# Patient Record
Sex: FEMALE | Race: WHITE | NOT HISPANIC OR LATINO | Employment: OTHER | URBAN - METROPOLITAN AREA
[De-identification: names, ages, dates, MRNs, and addresses within clinical notes are randomized per-mention and may not be internally consistent; named-entity substitution may affect disease eponyms.]

---

## 2017-01-04 RX ORDER — DICLOFENAC SODIUM 50 MG/1
TABLET, DELAYED RELEASE ORAL
Qty: 180 TABLET | Refills: 0 | Status: SHIPPED | OUTPATIENT
Start: 2017-01-04 | End: 2017-06-01 | Stop reason: SDUPTHER

## 2017-01-18 RX ORDER — TRAMADOL HYDROCHLORIDE 50 MG/1
TABLET ORAL
Qty: 30 TABLET | Refills: 1 | Status: SHIPPED | OUTPATIENT
Start: 2017-01-18 | End: 2017-09-13 | Stop reason: SDUPTHER

## 2017-05-05 RX ORDER — METOPROLOL TARTRATE 25 MG/1
TABLET, FILM COATED ORAL
Qty: 180 TABLET | Refills: 0 | Status: SHIPPED | OUTPATIENT
Start: 2017-05-05 | End: 2017-05-25

## 2017-05-05 RX ORDER — LOSARTAN POTASSIUM 100 MG/1
TABLET ORAL
Qty: 90 TABLET | Refills: 0 | Status: SHIPPED | OUTPATIENT
Start: 2017-05-05 | End: 2017-09-10 | Stop reason: SDUPTHER

## 2017-05-25 RX ORDER — METOPROLOL TARTRATE 25 MG/1
TABLET, FILM COATED ORAL
Qty: 180 TABLET | Refills: 0 | Status: ON HOLD | OUTPATIENT
Start: 2017-05-25 | End: 2018-03-22 | Stop reason: HOSPADM

## 2017-06-01 RX ORDER — DICLOFENAC SODIUM 50 MG/1
TABLET, DELAYED RELEASE ORAL
Qty: 180 TABLET | Refills: 0 | Status: SHIPPED | OUTPATIENT
Start: 2017-06-01 | End: 2017-09-03 | Stop reason: SDUPTHER

## 2017-07-25 RX ORDER — ALLOPURINOL 100 MG/1
TABLET ORAL
Qty: 90 TABLET | Refills: 0 | Status: SHIPPED | OUTPATIENT
Start: 2017-07-25 | End: 2017-10-19 | Stop reason: SDUPTHER

## 2017-09-03 DIAGNOSIS — I10 HTN (HYPERTENSION), BENIGN: Primary | ICD-10-CM

## 2017-09-03 DIAGNOSIS — E78.5 DYSLIPIDEMIA: ICD-10-CM

## 2017-09-05 RX ORDER — METOPROLOL TARTRATE 25 MG/1
TABLET, FILM COATED ORAL
Qty: 180 TABLET | Refills: 0 | Status: SHIPPED | OUTPATIENT
Start: 2017-09-05 | End: 2017-09-13 | Stop reason: SDUPTHER

## 2017-09-05 RX ORDER — DICLOFENAC SODIUM 50 MG/1
TABLET, DELAYED RELEASE ORAL
Qty: 60 TABLET | Refills: 0 | Status: SHIPPED | OUTPATIENT
Start: 2017-09-05 | End: 2017-09-05 | Stop reason: SDUPTHER

## 2017-09-05 RX ORDER — DICLOFENAC SODIUM 50 MG/1
TABLET, DELAYED RELEASE ORAL
Qty: 180 TABLET | Refills: 0 | Status: SHIPPED | OUTPATIENT
Start: 2017-09-05 | End: 2017-12-22 | Stop reason: SDUPTHER

## 2017-09-05 NOTE — TELEPHONE ENCOUNTER
Call pt and advise due for OV, lab.  Let us know of plans , need before can refill meds--particularly need lab in view of chronic use of diclofenac

## 2017-09-05 NOTE — TELEPHONE ENCOUNTER
Spoke with pt and was able to schedule an appt for preop and labs on 9/13/17.    Please sign attached lab orders.    Thanks.

## 2017-09-11 RX ORDER — LOSARTAN POTASSIUM 100 MG/1
TABLET ORAL
Qty: 90 TABLET | Refills: 1 | Status: ON HOLD | OUTPATIENT
Start: 2017-09-11 | End: 2018-03-22 | Stop reason: HOSPADM

## 2017-09-13 ENCOUNTER — LAB VISIT (OUTPATIENT)
Dept: LAB | Facility: HOSPITAL | Age: 67
End: 2017-09-13
Attending: INTERNAL MEDICINE
Payer: MEDICARE

## 2017-09-13 ENCOUNTER — OFFICE VISIT (OUTPATIENT)
Dept: INTERNAL MEDICINE | Facility: CLINIC | Age: 67
End: 2017-09-13
Payer: MEDICARE

## 2017-09-13 VITALS
HEART RATE: 54 BPM | BODY MASS INDEX: 41.54 KG/M2 | DIASTOLIC BLOOD PRESSURE: 67 MMHG | WEIGHT: 280.44 LBS | HEIGHT: 69 IN | SYSTOLIC BLOOD PRESSURE: 162 MMHG | TEMPERATURE: 99 F

## 2017-09-13 DIAGNOSIS — E11.9 DIABETES MELLITUS, TYPE II: ICD-10-CM

## 2017-09-13 DIAGNOSIS — Z01.818 PREOP EXAM FOR INTERNAL MEDICINE: Primary | ICD-10-CM

## 2017-09-13 DIAGNOSIS — I10 HTN (HYPERTENSION), BENIGN: ICD-10-CM

## 2017-09-13 DIAGNOSIS — M17.12 OSTEOARTHRITIS OF LEFT KNEE, UNSPECIFIED OSTEOARTHRITIS TYPE: ICD-10-CM

## 2017-09-13 DIAGNOSIS — E78.5 DYSLIPIDEMIA: ICD-10-CM

## 2017-09-13 DIAGNOSIS — G47.33 OBSTRUCTIVE SLEEP APNEA: ICD-10-CM

## 2017-09-13 LAB
ALBUMIN SERPL BCP-MCNC: 3.1 G/DL
ALP SERPL-CCNC: 92 U/L
ALT SERPL W/O P-5'-P-CCNC: 12 U/L
ANION GAP SERPL CALC-SCNC: 7 MMOL/L
AST SERPL-CCNC: 23 U/L
BASOPHILS # BLD AUTO: 0.03 K/UL
BASOPHILS NFR BLD: 0.7 %
BILIRUB SERPL-MCNC: 0.7 MG/DL
BUN SERPL-MCNC: 25 MG/DL
CALCIUM SERPL-MCNC: 9.1 MG/DL
CHLORIDE SERPL-SCNC: 108 MMOL/L
CHOLEST SERPL-MCNC: 218 MG/DL
CHOLEST/HDLC SERPL: 5.2 {RATIO}
CO2 SERPL-SCNC: 28 MMOL/L
CREAT SERPL-MCNC: 1.3 MG/DL
DIFFERENTIAL METHOD: ABNORMAL
EOSINOPHIL # BLD AUTO: 0.4 K/UL
EOSINOPHIL NFR BLD: 8 %
ERYTHROCYTE [DISTWIDTH] IN BLOOD BY AUTOMATED COUNT: 14.1 %
EST. GFR  (AFRICAN AMERICAN): 48.4 ML/MIN/1.73 M^2
EST. GFR  (NON AFRICAN AMERICAN): 42 ML/MIN/1.73 M^2
ESTIMATED AVG GLUCOSE: 105 MG/DL
GLUCOSE SERPL-MCNC: 88 MG/DL
HBA1C MFR BLD HPLC: 5.3 %
HCT VFR BLD AUTO: 33.1 %
HDLC SERPL-MCNC: 42 MG/DL
HDLC SERPL: 19.3 %
HGB BLD-MCNC: 10.8 G/DL
LDLC SERPL CALC-MCNC: 153.2 MG/DL
LYMPHOCYTES # BLD AUTO: 1.7 K/UL
LYMPHOCYTES NFR BLD: 39.3 %
MCH RBC QN AUTO: 30.9 PG
MCHC RBC AUTO-ENTMCNC: 32.6 G/DL
MCV RBC AUTO: 95 FL
MONOCYTES # BLD AUTO: 0.5 K/UL
MONOCYTES NFR BLD: 11.1 %
NEUTROPHILS # BLD AUTO: 1.8 K/UL
NEUTROPHILS NFR BLD: 40.7 %
NONHDLC SERPL-MCNC: 176 MG/DL
PLATELET # BLD AUTO: 144 K/UL
PMV BLD AUTO: 11.4 FL
POTASSIUM SERPL-SCNC: 5.3 MMOL/L
PROT SERPL-MCNC: 6.8 G/DL
RBC # BLD AUTO: 3.49 M/UL
SODIUM SERPL-SCNC: 143 MMOL/L
TRIGL SERPL-MCNC: 114 MG/DL
TSH SERPL DL<=0.005 MIU/L-ACNC: 3.27 UIU/ML
WBC # BLD AUTO: 4.4 K/UL

## 2017-09-13 PROCEDURE — 80061 LIPID PANEL: CPT

## 2017-09-13 PROCEDURE — 99999 PR PBB SHADOW E&M-EST. PATIENT-LVL III: CPT | Mod: PBBFAC,,, | Performed by: INTERNAL MEDICINE

## 2017-09-13 PROCEDURE — 3078F DIAST BP <80 MM HG: CPT | Mod: ,,, | Performed by: INTERNAL MEDICINE

## 2017-09-13 PROCEDURE — 3077F SYST BP >= 140 MM HG: CPT | Mod: ,,, | Performed by: INTERNAL MEDICINE

## 2017-09-13 PROCEDURE — 83036 HEMOGLOBIN GLYCOSYLATED A1C: CPT

## 2017-09-13 PROCEDURE — 93005 ELECTROCARDIOGRAM TRACING: CPT | Mod: PBBFAC,PO | Performed by: INTERNAL MEDICINE

## 2017-09-13 PROCEDURE — 1125F AMNT PAIN NOTED PAIN PRSNT: CPT | Mod: ,,, | Performed by: INTERNAL MEDICINE

## 2017-09-13 PROCEDURE — 1159F MED LIST DOCD IN RCRD: CPT | Mod: ,,, | Performed by: INTERNAL MEDICINE

## 2017-09-13 PROCEDURE — 99214 OFFICE O/P EST MOD 30 MIN: CPT | Mod: S$PBB,,, | Performed by: INTERNAL MEDICINE

## 2017-09-13 PROCEDURE — 36415 COLL VENOUS BLD VENIPUNCTURE: CPT | Mod: PO

## 2017-09-13 PROCEDURE — 93010 ELECTROCARDIOGRAM REPORT: CPT | Mod: ,,, | Performed by: INTERNAL MEDICINE

## 2017-09-13 PROCEDURE — 99213 OFFICE O/P EST LOW 20 MIN: CPT | Mod: PBBFAC,PO | Performed by: INTERNAL MEDICINE

## 2017-09-13 PROCEDURE — 85025 COMPLETE CBC W/AUTO DIFF WBC: CPT

## 2017-09-13 PROCEDURE — 80053 COMPREHEN METABOLIC PANEL: CPT

## 2017-09-13 PROCEDURE — 84443 ASSAY THYROID STIM HORMONE: CPT

## 2017-09-13 RX ORDER — BUDESONIDE AND FORMOTEROL FUMARATE DIHYDRATE 160; 4.5 UG/1; UG/1
2 AEROSOL RESPIRATORY (INHALATION) EVERY 12 HOURS
Qty: 10.2 G | Refills: 6 | Status: SHIPPED | OUTPATIENT
Start: 2017-09-13 | End: 2018-10-17

## 2017-09-13 RX ORDER — TRAMADOL HYDROCHLORIDE 50 MG/1
50 TABLET ORAL EVERY 6 HOURS PRN
Qty: 60 TABLET | Refills: 0 | Status: ON HOLD | OUTPATIENT
Start: 2017-09-13 | End: 2018-03-22 | Stop reason: HOSPADM

## 2017-09-13 NOTE — PROGRESS NOTES
"History of present illness:  67-year-old lady who is in today for a preoperative medical evaluation for planned left total knee arthroplasty scheduled for 2017 by Dr. Luis.  The patient has morbid obesity, hypertension, dyslipidemia and reactive airway disease.  Previously was treated for diabetes mellitus but has not required pharmacologic therapy since significant weight loss after bariatric surgery.  Currently she is without chest pain, palpitations, syncope, shortness of breath, cough, claudication.  She takes medications as directed.  She has tolerated previous surgeries without issue.    Current medications:  Losartan 100 mg daily.  Metoprolol 25 mg twice a day.  Amlodipine 5 mg daily.  Tramadol 50 mg when necessary  When necessary Symbicort inhaler.  Allopurinol 100 mg daily.  When necessary diclofenac.  Aspirin 81 mg daily.    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  Intolerant to adhesive tape.    Past medical history:  Hypertension.  Dyslipidemia.  Morbid obesity.  Reactive airway disease.  Status post bariatric surgery.  Previously treated for diabetes, pharmacologic therapy not record post weight loss.    Past surgical history:    Tonsillectomy.  Gastric sleeve bariatric surgery.  Belt abdominoplasty.  Rotator cuff repair.  Ankle arthroscopic surgery.    Social history:  Nonsmoker.  No alcohol excess.    Review of systems:  General: no fever, chills, generalized body aches. No unexpected weight loss.  Eyes:  No visual disturbances.  HEENT:  No hoarseness, dysphagia, ear pain.  Respiratory:  No cough, no shortness of breath.  Cardiovascular: no chest pain, palpitations, cough, exertional limb pain. No edema.  GI: no nausea, vomiting.  No abdominal pain. No change in bowel habits.  No melena, no hematochezia.  : no dysuria. No change in the color or character of the urine. No urinary frequency.  Skin:  No rashes or other concerns.  Psych:  No emotional " issues    Physical examination:  Vital signs: All pressure 142/70 taken by this examiner.  HEENT: Mouth appears normal no thrush.  Neck supple no masses.  Lungs: Clear to auscultation.  Cardiovascular: Normal heart sounds with no significant murmur.  Carotids full bilaterally without bruits.  No ischemic changes lower extremities.  GI: The abdomen is soft and benign.  Neurologic: No gross motor deficits.  Gait normal.  Mental status: Alert oriented affect and mood all appropriate.    Impression:  67-year-old lady with morbid obesity BMI 41.41, hypertension, gout, dyslipidemia and reactive airway disease all currently controlled and stable pending review of lab data.    Plan:  Review pending laboratory data to include CBC chemistry profile and others and if all reasonable she will be considered a reasonable candidate for the planned procedure.    Addendum:  Follow-up CBC, metabolic profile and ECGs are noted and reviewed.  She has a mild anemia which is normocytic normochromic.  Ferritin is normal.  She is a reasonable candidate for the planned orthopedic surgery.  Our evaluation will will be forwarded to her orthopedic physician Dr. Luis.  Follow-up after surgery

## 2017-09-28 ENCOUNTER — TELEPHONE (OUTPATIENT)
Dept: INTERNAL MEDICINE | Facility: CLINIC | Age: 67
End: 2017-09-28

## 2017-09-28 DIAGNOSIS — I10 HTN (HYPERTENSION), BENIGN: ICD-10-CM

## 2017-09-28 DIAGNOSIS — D64.9 ANEMIA, UNSPECIFIED TYPE: Primary | ICD-10-CM

## 2017-09-28 NOTE — TELEPHONE ENCOUNTER
I spoke with the patient this evening by phone and discussed her recent lab data.  Very mild normocytic normochromic anemia slightly more so than when she has demonstrated in the last year.  Potassium is upper normal range more than likely due to her ARB therapy.  We'll have her come in for repeat CBC, iron studies and recheck basic metabolic profile.    Jose E-  Please call the patient and set update for her to come in for a nonfasting blood work-CBC, BMP, iron studies.

## 2017-09-29 ENCOUNTER — LAB VISIT (OUTPATIENT)
Dept: LAB | Facility: HOSPITAL | Age: 67
End: 2017-09-29
Attending: INTERNAL MEDICINE
Payer: MEDICARE

## 2017-09-29 DIAGNOSIS — I10 HTN (HYPERTENSION), BENIGN: ICD-10-CM

## 2017-09-29 DIAGNOSIS — D64.9 ANEMIA, UNSPECIFIED TYPE: ICD-10-CM

## 2017-09-29 LAB
ANION GAP SERPL CALC-SCNC: 5 MMOL/L
BASOPHILS # BLD AUTO: 0.03 K/UL
BASOPHILS NFR BLD: 0.6 %
BUN SERPL-MCNC: 20 MG/DL
CALCIUM SERPL-MCNC: 8.9 MG/DL
CHLORIDE SERPL-SCNC: 108 MMOL/L
CO2 SERPL-SCNC: 29 MMOL/L
CREAT SERPL-MCNC: 1 MG/DL
DIFFERENTIAL METHOD: ABNORMAL
EOSINOPHIL # BLD AUTO: 0.4 K/UL
EOSINOPHIL NFR BLD: 6.6 %
ERYTHROCYTE [DISTWIDTH] IN BLOOD BY AUTOMATED COUNT: 14.4 %
EST. GFR  (AFRICAN AMERICAN): >60 ML/MIN/1.73 M^2
EST. GFR  (NON AFRICAN AMERICAN): 57.6 ML/MIN/1.73 M^2
FERRITIN SERPL-MCNC: 41 NG/ML
GLUCOSE SERPL-MCNC: 83 MG/DL
HCT VFR BLD AUTO: 33.8 %
HGB BLD-MCNC: 10.7 G/DL
IRON SERPL-MCNC: 77 UG/DL
LYMPHOCYTES # BLD AUTO: 2 K/UL
LYMPHOCYTES NFR BLD: 36.6 %
MCH RBC QN AUTO: 30.7 PG
MCHC RBC AUTO-ENTMCNC: 31.7 G/DL
MCV RBC AUTO: 97 FL
MONOCYTES # BLD AUTO: 0.5 K/UL
MONOCYTES NFR BLD: 8.8 %
NEUTROPHILS # BLD AUTO: 2.6 K/UL
NEUTROPHILS NFR BLD: 47.2 %
PLATELET # BLD AUTO: 159 K/UL
PMV BLD AUTO: 11 FL
POTASSIUM SERPL-SCNC: 4.5 MMOL/L
RBC # BLD AUTO: 3.48 M/UL
SATURATED IRON: 22 %
SODIUM SERPL-SCNC: 142 MMOL/L
TOTAL IRON BINDING CAPACITY: 348 UG/DL
TRANSFERRIN SERPL-MCNC: 235 MG/DL
WBC # BLD AUTO: 5.44 K/UL

## 2017-09-29 PROCEDURE — 36415 COLL VENOUS BLD VENIPUNCTURE: CPT | Mod: PO

## 2017-09-29 PROCEDURE — 80048 BASIC METABOLIC PNL TOTAL CA: CPT

## 2017-09-29 PROCEDURE — 85025 COMPLETE CBC W/AUTO DIFF WBC: CPT

## 2017-09-29 PROCEDURE — 83540 ASSAY OF IRON: CPT

## 2017-09-29 PROCEDURE — 82728 ASSAY OF FERRITIN: CPT

## 2017-09-29 NOTE — TELEPHONE ENCOUNTER
Spoke with pt who requested to have lab appt for today (9/29/17) at 1 pm at the [facility] as she will be in [place] today for another appt.    Scheduled.

## 2017-10-18 ENCOUNTER — TELEPHONE (OUTPATIENT)
Dept: INTERNAL MEDICINE | Facility: CLINIC | Age: 67
End: 2017-10-18

## 2017-10-18 NOTE — TELEPHONE ENCOUNTER
----- Message from Clarita Cynthia sent at 10/18/2017 11:52 AM CDT -----  Contact: pt [phone]  Pt would a copy of her EKG results,fax to [facility] [phone] pt is having surgery on 10- ,please advise

## 2017-10-19 ENCOUNTER — TELEPHONE (OUTPATIENT)
Dept: INTERNAL MEDICINE | Facility: CLINIC | Age: 67
End: 2017-10-19

## 2017-10-19 RX ORDER — ALLOPURINOL 100 MG/1
TABLET ORAL
Qty: 90 TABLET | Refills: 0 | Status: SHIPPED | OUTPATIENT
Start: 2017-10-19 | End: 2018-01-18 | Stop reason: SDUPTHER

## 2017-10-19 NOTE — TELEPHONE ENCOUNTER
----- Message from Day Vasquez sent at 10/19/2017  2:38 PM CDT -----  Contact: Cassidy Cruz office [phone]  Requesting confirmation of paperwork faxed 10/18        Fax [phone]

## 2017-10-19 NOTE — TELEPHONE ENCOUNTER
Spoke with Cassidy at Dr. Luis's office who advised that the clearance paperwork that was sent earlier today went directly to the hospital at [place] but that Dr. Luis needs the same clearance paperwork for his files in the office.    Faxed again to Dr. Luis's office.

## 2017-11-13 ENCOUNTER — TELEPHONE (OUTPATIENT)
Dept: INTERNAL MEDICINE | Facility: CLINIC | Age: 67
End: 2017-11-13

## 2017-11-13 RX ORDER — METHYLPREDNISOLONE 4 MG/1
TABLET ORAL
Qty: 1 PACKAGE | Refills: 0 | Status: SHIPPED | OUTPATIENT
Start: 2017-11-13 | End: 2017-12-04

## 2017-11-13 NOTE — TELEPHONE ENCOUNTER
----- Message from James Arndt sent at 11/13/2017 10:31 AM CST -----  Contact: Pt at [phone]  Pt said script allopurinol (ZYLOPRIM) 100 MG tablet is not working because the gout has flared up again. Pt requesting a call back to discuss.

## 2017-11-13 NOTE — TELEPHONE ENCOUNTER
Is she taking anything for the gout flare?  The dose of allopurinol will need to be increased but not until the current flare has subsided.

## 2017-11-15 ENCOUNTER — TELEPHONE (OUTPATIENT)
Dept: INTERNAL MEDICINE | Facility: CLINIC | Age: 67
End: 2017-11-15

## 2017-11-15 NOTE — TELEPHONE ENCOUNTER
Yes, the first couple days are higher dose and can cause those sx's in some people. Should subside with continuing the lower doses

## 2017-11-15 NOTE — TELEPHONE ENCOUNTER
----- Message from Bernie Bass sent at 11/15/2017  7:41 AM CST -----  Contact: self [phone] (M)  Patient state she had side effect from medication methylPREDNISolone (MEDROL DOSEPACK) 4 mg tablet, woke up with headache,nausea with dizziness, when pt call in asks if she were dizzy stated not at this moment.    Please advise

## 2017-11-20 ENCOUNTER — TELEPHONE (OUTPATIENT)
Dept: INTERNAL MEDICINE | Facility: CLINIC | Age: 67
End: 2017-11-20

## 2017-11-20 NOTE — TELEPHONE ENCOUNTER
Informed the patient that Dr. Kat is out of the office. Patient stated that the prednisone didn't help concerning the gout. Patient wanted to know any recommendations?

## 2017-11-20 NOTE — TELEPHONE ENCOUNTER
----- Message from Susan Mendoza sent at 11/20/2017  3:44 PM CST -----  Contact: patient- [phone]  Finished medication and it didn't help. Prednisone ( steroid)

## 2017-11-22 ENCOUNTER — PATIENT MESSAGE (OUTPATIENT)
Dept: INTERNAL MEDICINE | Facility: CLINIC | Age: 67
End: 2017-11-22

## 2017-11-22 ENCOUNTER — TELEPHONE (OUTPATIENT)
Dept: INTERNAL MEDICINE | Facility: CLINIC | Age: 67
End: 2017-11-22

## 2017-11-22 RX ORDER — COLCHICINE 0.6 MG/1
TABLET ORAL
Qty: 30 TABLET | Refills: 1 | Status: SHIPPED | OUTPATIENT
Start: 2017-11-22 | End: 2017-11-24 | Stop reason: SDUPTHER

## 2017-11-22 NOTE — TELEPHONE ENCOUNTER
Spoke with pt who advises that she is also taking Diclofenac (2 daily).    Please advise if pt is to take Colchicine as well.    Thanks.

## 2017-11-22 NOTE — TELEPHONE ENCOUNTER
"Clarify what joint is having "joint flare"  Has she taken anything other than the steroid?  ?colchicine?  Her diclofenac?  "

## 2017-11-22 NOTE — TELEPHONE ENCOUNTER
I had also wanted clarification if she had taken anything else for attack such as her diclofenac or colchicine.  I will assume she has not tried colchicine.  please call in rx as entered.  Let us know

## 2017-11-22 NOTE — TELEPHONE ENCOUNTER
Per phone note from 11/13/17, pt has been taking pain meds (from her knee surgery) and allopurinol.    Spoke with pt who advises that her great toe and 2nd digit of her L foot. And her foot feels like it's being squeezed and feels like it's on fire.      At night she cannot put heavy covers on as it hurts, and her knee replacement is the L knee as well.    She is still taking the allopurinol and pain meds.    She has finished the steroids.    Please advise.    Thanks.

## 2017-11-22 NOTE — TELEPHONE ENCOUNTER
----- Message from Latesha Flor sent at 11/22/2017  9:51 AM CST -----  Contact: Pt [phone]  Patient would like to get medical advice.  Symptoms (please be specific):  gout  How long has patient had these symptoms:  2 weeks   Pharmacy name and phone #:  [facility] [id number] [place] [address] [id number] AT [facility] [address] [phone] (Phone)  [phone] (Fax)  Any drug allergies:    Comments: pt states she finished medication and is not feeling better

## 2017-11-24 ENCOUNTER — TELEPHONE (OUTPATIENT)
Dept: INTERNAL MEDICINE | Facility: CLINIC | Age: 67
End: 2017-11-24

## 2017-11-24 RX ORDER — COLCHICINE 0.6 MG/1
TABLET ORAL
Qty: 30 TABLET | Refills: 1 | Status: SHIPPED | OUTPATIENT
Start: 2017-11-24 | End: 2018-01-19

## 2017-11-24 NOTE — TELEPHONE ENCOUNTER
I sent as call in because of sig, sometimes will not go through e-sig if the Sig is too lengthy or other.  I will try to send in through E-sig and see what happens--will print automatically if does not go through.

## 2017-11-24 NOTE — TELEPHONE ENCOUNTER
Dr. Kat we are unable to get through to the pharmacy through it's automated system.    The Colchicine was set to be phoned in.    Please resend as loaded for normal delivery.    Thanks.

## 2017-11-24 NOTE — TELEPHONE ENCOUNTER
----- Message from Latesha Flor sent at 11/24/2017  8:55 AM CST -----  Contact: Pt   Pt would like a call back from the nurse. She states that she was told that a prescription was called in on Wednesday 11/22 but the pharmacy has no record of anything being called in. Please advise pt.        [facility] [employer] [address] [place] [address] [address] AT [facility] [place] [facility] [address] [phone] (Phone)  [phone] (Fax)

## 2017-11-27 RX ORDER — AMLODIPINE BESYLATE 5 MG/1
TABLET ORAL
Qty: 90 TABLET | Refills: 3 | Status: ON HOLD | OUTPATIENT
Start: 2017-11-27 | End: 2018-03-22 | Stop reason: HOSPADM

## 2017-12-12 ENCOUNTER — OFFICE VISIT (OUTPATIENT)
Dept: INTERNAL MEDICINE | Facility: CLINIC | Age: 67
End: 2017-12-12
Payer: MEDICARE

## 2017-12-12 ENCOUNTER — HOSPITAL ENCOUNTER (OUTPATIENT)
Dept: RADIOLOGY | Facility: HOSPITAL | Age: 67
Discharge: HOME | End: 2017-12-12
Attending: INTERNAL MEDICINE
Payer: MEDICARE

## 2017-12-12 VITALS
SYSTOLIC BLOOD PRESSURE: 139 MMHG | BODY MASS INDEX: 38.99 KG/M2 | TEMPERATURE: 98 F | HEIGHT: 69 IN | DIASTOLIC BLOOD PRESSURE: 60 MMHG | RESPIRATION RATE: 16 BRPM | HEART RATE: 65 BPM | WEIGHT: 263.25 LBS

## 2017-12-12 DIAGNOSIS — M10.9 GOUTY ARTHRITIS OF TOE OF LEFT FOOT: ICD-10-CM

## 2017-12-12 DIAGNOSIS — M79.675 PAIN OF LEFT GREAT TOE: Primary | ICD-10-CM

## 2017-12-12 DIAGNOSIS — M79.675 PAIN OF LEFT GREAT TOE: ICD-10-CM

## 2017-12-12 PROCEDURE — 99999 PR PBB SHADOW E&M-EST. PATIENT-LVL III: CPT | Mod: PBBFAC,,, | Performed by: INTERNAL MEDICINE

## 2017-12-12 PROCEDURE — 99214 OFFICE O/P EST MOD 30 MIN: CPT | Mod: S$PBB,,, | Performed by: INTERNAL MEDICINE

## 2017-12-12 PROCEDURE — 99213 OFFICE O/P EST LOW 20 MIN: CPT | Mod: PBBFAC,PO,25 | Performed by: INTERNAL MEDICINE

## 2017-12-12 PROCEDURE — 73660 X-RAY EXAM OF TOE(S): CPT | Mod: 26,LT,, | Performed by: RADIOLOGY

## 2017-12-12 PROCEDURE — 73660 X-RAY EXAM OF TOE(S): CPT | Mod: TC,PO

## 2017-12-12 RX ORDER — PREDNISONE 20 MG/1
TABLET ORAL
Qty: 20 TABLET | Refills: 0 | Status: SHIPPED | OUTPATIENT
Start: 2017-12-12 | End: 2018-01-19

## 2017-12-12 RX ORDER — OXYCODONE AND ACETAMINOPHEN 5; 325 MG/1; MG/1
TABLET ORAL
Refills: 0 | Status: ON HOLD | COMMUNITY
Start: 2017-11-17 | End: 2018-03-22 | Stop reason: HOSPADM

## 2017-12-12 NOTE — PROGRESS NOTES
Subjective:       Patient ID: Marian Samuel is a 67 y.o. female.    Chief Complaint: Foot Pain (lt)    HPI  Pt with gout is here for evaluation of 5 weeks of persistent left big toe pain described as a dull ache associated with numbness. She was treated with a steroid pack and then colchicine which has not helped much.    Review of Systems   Constitutional: Negative for activity change, appetite change, chills, diaphoresis, fatigue, fever and unexpected weight change.   HENT: Negative for postnasal drip, rhinorrhea, sinus pressure, sneezing, sore throat, trouble swallowing and voice change.    Respiratory: Negative for cough, shortness of breath and wheezing.    Cardiovascular: Negative for chest pain, palpitations and leg swelling.   Gastrointestinal: Negative for abdominal pain, blood in stool, constipation, diarrhea, nausea and vomiting.   Genitourinary: Negative for dysuria.   Musculoskeletal: Positive for arthralgias. Negative for myalgias.   Skin: Negative for rash and wound.   Allergic/Immunologic: Negative for environmental allergies and food allergies.   Hematological: Negative for adenopathy. Does not bruise/bleed easily.       Objective:      Physical Exam   Constitutional: She is oriented to person, place, and time. She appears well-developed and well-nourished. No distress.   HENT:   Head: Normocephalic and atraumatic.   Eyes: Conjunctivae and EOM are normal. Pupils are equal, round, and reactive to light. Right eye exhibits no discharge. Left eye exhibits no discharge. No scleral icterus.   Neck: Neck supple. No JVD present.   Cardiovascular: Normal rate, regular rhythm, normal heart sounds and intact distal pulses.    Pulmonary/Chest: Effort normal and breath sounds normal. No respiratory distress. She has no wheezes. She has no rales.   Musculoskeletal: She exhibits no edema.        Feet:    Lymphadenopathy:     She has no cervical adenopathy.   Neurological: She is alert and oriented to person, place,  and time.   Skin: Skin is warm and dry. No rash noted. She is not diaphoretic. No pallor.       Assessment:       1. Pain of left great toe    2. 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 X-ray toe          Rx Prednisone taper, continue Colchicine/Allopurinol

## 2017-12-22 RX ORDER — DICLOFENAC SODIUM 50 MG/1
TABLET, DELAYED RELEASE ORAL
Qty: 180 TABLET | Refills: 0 | Status: ON HOLD | OUTPATIENT
Start: 2017-12-22 | End: 2018-03-22 | Stop reason: HOSPADM

## 2018-01-18 RX ORDER — ALLOPURINOL 100 MG/1
TABLET ORAL
Qty: 90 TABLET | Refills: 0 | Status: ON HOLD | OUTPATIENT
Start: 2018-01-18 | End: 2018-03-22 | Stop reason: HOSPADM

## 2018-01-19 PROBLEM — R10.9 ABDOMINAL PAIN: Status: ACTIVE | Noted: 2018-01-19

## 2018-01-19 PROBLEM — K57.20 DIVERTICULITIS OF LARGE INTESTINE WITH PERFORATION AND ABSCESS WITHOUT BLEEDING: Status: ACTIVE | Noted: 2018-01-19

## 2018-01-21 PROBLEM — K25.1 ACUTE GASTRIC ULCER WITH PERFORATION: Status: ACTIVE | Noted: 2018-01-21

## 2018-01-21 PROBLEM — K63.1 COLON PERFORATION: Status: ACTIVE | Noted: 2018-01-21

## 2018-02-15 PROBLEM — E87.1 HYPONATREMIA: Status: ACTIVE | Noted: 2018-02-15

## 2018-02-19 PROBLEM — D64.9 POSTOPERATIVE ANEMIA: Status: ACTIVE | Noted: 2018-02-19

## 2018-02-19 PROBLEM — E43 SEVERE PROTEIN-CALORIE MALNUTRITION: Status: ACTIVE | Noted: 2018-02-19

## 2018-02-21 PROBLEM — R60.0 BILATERAL LEG EDEMA: Status: ACTIVE | Noted: 2018-02-21

## 2018-02-27 ENCOUNTER — HOSPITAL ENCOUNTER (OUTPATIENT)
Facility: HOSPITAL | Age: 68
Discharge: HOME | End: 2018-02-27
Attending: INTERNAL MEDICINE | Admitting: INTERNAL MEDICINE
Payer: COMMERCIAL

## 2018-02-27 ENCOUNTER — ANESTHESIA (OUTPATIENT)
Dept: ENDOSCOPY | Facility: HOSPITAL | Age: 68
End: 2018-02-27
Payer: MEDICARE

## 2018-02-27 ENCOUNTER — TELEPHONE (OUTPATIENT)
Dept: GASTROENTEROLOGY | Facility: CLINIC | Age: 68
End: 2018-02-27

## 2018-02-27 ENCOUNTER — ANESTHESIA EVENT (OUTPATIENT)
Dept: ENDOSCOPY | Facility: HOSPITAL | Age: 68
End: 2018-02-27
Payer: COMMERCIAL

## 2018-02-27 VITALS
TEMPERATURE: 99 F | BODY MASS INDEX: 41.47 KG/M2 | OXYGEN SATURATION: 100 % | RESPIRATION RATE: 12 BRPM | HEIGHT: 69 IN | WEIGHT: 280 LBS | HEART RATE: 65 BPM | SYSTOLIC BLOOD PRESSURE: 138 MMHG | DIASTOLIC BLOOD PRESSURE: 44 MMHG

## 2018-02-27 DIAGNOSIS — K91.89 GASTRIC LEAK: Primary | ICD-10-CM

## 2018-02-27 PROBLEM — K25.5 GASTRIC PERFORATION: Status: ACTIVE | Noted: 2018-02-27

## 2018-02-27 LAB
POCT GLUCOSE: 179 MG/DL (ref 70–110)
POCT GLUCOSE: 201 MG/DL (ref 70–110)

## 2018-02-27 PROCEDURE — D9220A PRA ANESTHESIA: Mod: CRNA,,, | Performed by: NURSE ANESTHETIST, CERTIFIED REGISTERED

## 2018-02-27 PROCEDURE — 37000009 HC ANESTHESIA EA ADD 15 MINS: Performed by: INTERNAL MEDICINE

## 2018-02-27 PROCEDURE — 27201691: Performed by: INTERNAL MEDICINE

## 2018-02-27 PROCEDURE — 94761 N-INVAS EAR/PLS OXIMETRY MLT: CPT

## 2018-02-27 PROCEDURE — 43999 UNLISTED PROCEDURE STOMACH: CPT | Mod: ,,, | Performed by: INTERNAL MEDICINE

## 2018-02-27 PROCEDURE — 27000221 HC OXYGEN, UP TO 24 HOURS

## 2018-02-27 PROCEDURE — 25000003 PHARM REV CODE 250: Performed by: INTERNAL MEDICINE

## 2018-02-27 PROCEDURE — 37000008 HC ANESTHESIA 1ST 15 MINUTES: Performed by: INTERNAL MEDICINE

## 2018-02-27 PROCEDURE — 82962 GLUCOSE BLOOD TEST: CPT | Performed by: INTERNAL MEDICINE

## 2018-02-27 PROCEDURE — 63600175 PHARM REV CODE 636 W HCPCS: Performed by: ANESTHESIOLOGY

## 2018-02-27 PROCEDURE — 27201690: Performed by: INTERNAL MEDICINE

## 2018-02-27 PROCEDURE — 63600175 PHARM REV CODE 636 W HCPCS: Performed by: NURSE ANESTHETIST, CERTIFIED REGISTERED

## 2018-02-27 PROCEDURE — D9220A PRA ANESTHESIA: Mod: ANES,,, | Performed by: ANESTHESIOLOGY

## 2018-02-27 PROCEDURE — 43999 UNLISTED PROCEDURE STOMACH: CPT | Performed by: INTERNAL MEDICINE

## 2018-02-27 PROCEDURE — 25000003 PHARM REV CODE 250: Performed by: NURSE ANESTHETIST, CERTIFIED REGISTERED

## 2018-02-27 RX ORDER — ONDANSETRON 2 MG/ML
4 INJECTION INTRAMUSCULAR; INTRAVENOUS ONCE
Status: COMPLETED | OUTPATIENT
Start: 2018-02-27 | End: 2018-02-27

## 2018-02-27 RX ORDER — SUCCINYLCHOLINE CHLORIDE 20 MG/ML
INJECTION INTRAMUSCULAR; INTRAVENOUS
Status: DISCONTINUED | OUTPATIENT
Start: 2018-02-27 | End: 2018-02-27

## 2018-02-27 RX ORDER — SODIUM CHLORIDE 9 MG/ML
INJECTION, SOLUTION INTRAVENOUS CONTINUOUS
Status: DISCONTINUED | OUTPATIENT
Start: 2018-02-27 | End: 2018-02-27 | Stop reason: HOSPADM

## 2018-02-27 RX ORDER — LIDOCAINE HCL/PF 100 MG/5ML
SYRINGE (ML) INTRAVENOUS
Status: DISCONTINUED | OUTPATIENT
Start: 2018-02-27 | End: 2018-02-27

## 2018-02-27 RX ORDER — PROPOFOL 10 MG/ML
VIAL (ML) INTRAVENOUS
Status: DISCONTINUED | OUTPATIENT
Start: 2018-02-27 | End: 2018-02-27

## 2018-02-27 RX ORDER — FENTANYL CITRATE 50 UG/ML
INJECTION, SOLUTION INTRAMUSCULAR; INTRAVENOUS
Status: DISCONTINUED | OUTPATIENT
Start: 2018-02-27 | End: 2018-02-27

## 2018-02-27 RX ORDER — ROCURONIUM BROMIDE 10 MG/ML
INJECTION, SOLUTION INTRAVENOUS
Status: DISCONTINUED | OUTPATIENT
Start: 2018-02-27 | End: 2018-02-27

## 2018-02-27 RX ORDER — MIDAZOLAM HYDROCHLORIDE 1 MG/ML
INJECTION, SOLUTION INTRAMUSCULAR; INTRAVENOUS
Status: DISCONTINUED | OUTPATIENT
Start: 2018-02-27 | End: 2018-02-27

## 2018-02-27 RX ADMIN — SUCCINYLCHOLINE CHLORIDE 120 MG: 20 INJECTION, SOLUTION INTRAMUSCULAR; INTRAVENOUS at 01:02

## 2018-02-27 RX ADMIN — SODIUM CHLORIDE: 0.9 INJECTION, SOLUTION INTRAVENOUS at 01:02

## 2018-02-27 RX ADMIN — LIDOCAINE HYDROCHLORIDE 80 MG: 20 INJECTION, SOLUTION INTRAVENOUS at 01:02

## 2018-02-27 RX ADMIN — FENTANYL CITRATE 25 MCG: 50 INJECTION, SOLUTION INTRAMUSCULAR; INTRAVENOUS at 02:02

## 2018-02-27 RX ADMIN — FENTANYL CITRATE 50 MCG: 50 INJECTION, SOLUTION INTRAMUSCULAR; INTRAVENOUS at 01:02

## 2018-02-27 RX ADMIN — FENTANYL CITRATE 25 MCG: 50 INJECTION, SOLUTION INTRAMUSCULAR; INTRAVENOUS at 01:02

## 2018-02-27 RX ADMIN — ROCURONIUM BROMIDE 5 MG: 10 INJECTION, SOLUTION INTRAVENOUS at 01:02

## 2018-02-27 RX ADMIN — MIDAZOLAM HYDROCHLORIDE 2 MG: 1 INJECTION, SOLUTION INTRAMUSCULAR; INTRAVENOUS at 01:02

## 2018-02-27 RX ADMIN — PROPOFOL 100 MG: 10 INJECTION, EMULSION INTRAVENOUS at 01:02

## 2018-02-27 RX ADMIN — ONDANSETRON 4 MG: 2 INJECTION INTRAMUSCULAR; INTRAVENOUS at 04:02

## 2018-02-27 NOTE — PROGRESS NOTES
EMS notified of pt's transport needs. Stated to arrived between 1630 and 1645. Pt referral center notified of estimated time of arrival. [facility] arrived at [facility]. [facility] notified of discharge time.

## 2018-02-27 NOTE — TRANSFER OF CARE
"Anesthesia Transfer of Care Note    Patient: Marian Samuel    Procedure(s) Performed: Procedure(s) (LRB):  ESOPHAGOGASTRODUODENOSCOPY (EGD) (N/A)    Patient location: PACU    Anesthesia Type: general    Transport from OR: Transported from OR on 100% O2 by closed face mask with adequate spontaneous ventilation    Post pain: adequate analgesia    Post assessment: no apparent anesthetic complications    Post vital signs: stable    Level of consciousness: sedated    Nausea/Vomiting: no nausea/vomiting    Complications: none    Transfer of care protocol was followed      Last vitals:   Visit Vitals  BP (!) 135/57 (BP Location: Left arm, Patient Position: Sitting)   Pulse 64   Temp 36.4 °C (97.5 °F) (Temporal)   Resp 17   Ht 5' 9" (1.753 m)   Wt 127 kg (280 lb)   SpO2 98%   Breastfeeding? No   BMI 41.35 kg/m²     "

## 2018-02-27 NOTE — ANESTHESIA POSTPROCEDURE EVALUATION
"Anesthesia Post Evaluation    Patient: Marian Samuel    Procedure(s) Performed: Procedure(s) (LRB):  ESOPHAGOGASTRODUODENOSCOPY (EGD) (N/A)    Final Anesthesia Type: general  Patient location during evaluation: PACU  Patient participation: Yes- Able to Participate  Level of consciousness: awake and alert and oriented  Post-procedure vital signs: reviewed and stable  Pain management: adequate  Airway patency: patent  PONV status at discharge: No PONV  Anesthetic complications: no      Cardiovascular status: blood pressure returned to baseline  Respiratory status: unassisted, spontaneous ventilation and room air  Hydration status: euvolemic  Follow-up not needed.        Visit Vitals  BP (!) 167/72 (BP Location: Right arm, Patient Position: Lying)   Pulse 85   Temp 36.5 °C (97.7 °F) (Temporal)   Resp 20   Ht 5' 9" (1.753 m)   Wt 127 kg (280 lb)   SpO2 100%   Breastfeeding? No   BMI 41.35 kg/m²       Pain/Josefina Score: Pain Assessment Performed: Yes (2/27/2018  2:30 PM)  Presence of Pain: denies (2/27/2018  2:30 PM)  Pain Rating Prior to Med Admin: 0 (2/27/2018  8:24 AM)  Josefina Score: 10 (2/27/2018  2:30 PM)      "

## 2018-02-27 NOTE — PROVATION PATIENT INSTRUCTIONS
Discharge Summary/Instructions after an Endoscopic Procedure  Patient Name: Marian Samuel  Patient MRN: [id number]  Patient YOB: 1950  Tuesday, February 27, 2018  Maryam Moreland MD  RESTRICTIONS:  During your procedure today, you received medications for sedation.  These   medications may affect your judgment, balance and coordination.  Therefore,   for 24 hours, you have the following restrictions:   - DO NOT drive a car, operate machinery, make legal/financial decisions,   sign important papers or drink alcohol.    ACTIVITY:  The following day: return to full activity including work, except no heavy   lifting, straining or running for 3 days if polyps were removed.  DIET:  Eat and drink normally unless instructed otherwise.     TREATMENT FOR COMMON SIDE EFFECTS:  - Mild abdominal pain, nausea, belching, bloating or excessive gas:  rest,   eat lightly and use a heating pad.  - Sore Throat: treat with throat lozenges and/or gargle with warm salt   water.  - Because air was used during the procedure, expelling large amounts of air   from your rectum or belching is normal.  - If a bowel prep was taken, you may not have a bowel movement for 1-3 days.    This is normal.  SYMPTOMS TO WATCH FOR AND REPORT TO YOUR PHYSICIAN:  1. Abdominal pain or bloating, other than gas cramps.  2. Chest pain.  3. Back pain.  4. Signs of infection such as: chills or fever occurring within 24 hours   after the procedure.  5. Rectal bleeding, which would show as bright red, maroon, or black stools.   (A tablespoon of blood from the rectum is not serious, especially if   hemorrhoids are present.)  6. Vomiting.  7. Weakness or dizziness.  GO DIRECTLY TO THE NEAREST EMERGENCY ROOM IF YOU HAVE ANY OF THE FOLLOWING:      Difficulty breathing  Chills and/or fever over 101 F   Persistent vomiting and/or vomiting blood   Severe abdominal pain   Severe chest pain   Black, tarry stools   Bleeding- more than one tablespoon   Any other  symptom or condition that you feel may need urgent attention  Your doctor recommends these additional instructions:  If any biopsies were taken, your doctors clinic will contact you in 1 to 2   weeks with any results.  Resume your previous diet.   Continue your present medications.   Return to your referring physician.  For questions, problems or results please call your physician - Maryam Moreland MD at Work:  [phone].  [facility], EMERGENCY ROOM PHONE NUMBER: [phone]  IF A COMPLICATION OR EMERGENCY SITUATION ARISES AND YOU ARE UNABLE TO REACH   YOUR PHYSICIAN - GO DIRECTLY TO THE EMERGENCY ROOM.  Maryam Moreland MD  2/27/2018 2:45:16 PM  This report has been verified and signed electronically.

## 2018-02-27 NOTE — NURSING TRANSFER
Nursing Transfer Note      2/27/2018     Transfer To: [facility]    Transfer via stretcher    Transfer with IV pole    Transported by EMS    Medicines sent: none    Chart send with patient: Yes    Notified: blanquita BETANCOURT    Patient reassessed at: 8380 2/27/18    Upon arrival to floor: patient oriented to room, call bell in reach and bed in lowest position    VSS. No complaints of pain reported. Pt able to tolerate PO intake.  remains at bedside.

## 2018-02-27 NOTE — ANESTHESIA PREPROCEDURE EVALUATION
02/27/2018  Marian Samuel is a 67 y.o., female.    Pre-op Assessment    I have reviewed the Patient Summary Reports.      I have reviewed the Medications.     Review of Systems  Anesthesia Hx:  No problems with previous Anesthesia    Social:  Former Smoker    Hematology/Oncology:         -- Anemia:   Cardiovascular:   Exercise tolerance: poor Hypertension ECG has been reviewed. ST negative '13   Pulmonary:   Sleep Apnea No CPAP since weight loss   Hepatic/GI:   PUD, S/p gastric sleeve '14  Gastric perforation 1/18   Neurological:  Neurology Normal    Endocrine:   Diabetes, well controlled, type 2        Physical Exam  General:  Obesity    Airway/Jaw/Neck:  Airway Findings: Mouth Opening: Normal Tongue: Normal  Pre-Existing Airway Tube(s): Oral Endotracheal tube  Mallampati: II  TM Distance: Normal, at least 6 cm  Jaw/Neck Findings:  Neck ROM: Normal ROM      Dental:  Dental Findings: In tact   Chest/Lungs:  Chest/Lungs Findings: Normal Respiratory Rate, Clear to auscultation     Heart/Vascular:  Heart Findings: Rate: Normal  Rhythm: Regular Rhythm             Anesthesia Plan  Type of Anesthesia, risks & benefits discussed:  Anesthesia Type:  general  Patient's Preference:   Intra-op Monitoring Plan: standard ASA monitors  Intra-op Monitoring Plan Comments:   Post Op Pain Control Plan:   Post Op Pain Control Plan Comments:   Induction:   IV  Beta Blocker:  Patient is not currently on a Beta-Blocker (No further documentation required).       Informed Consent: Patient representative understands risks and agrees with Anesthesia plan.  Questions answered. Anesthesia consent signed with patient representative.  ASA Score: 3     Day of Surgery Review of History & Physical:    H&P update referred to the provider.         Ready For Surgery From Anesthesia Perspective.

## 2018-02-27 NOTE — ANESTHESIA RELEASE NOTE
Anesthesia Release from PACU Note    Patient: Marian Samuel    Procedure(s) Performed: Procedure(s) (LRB):  ESOPHAGOGASTRODUODENOSCOPY (EGD) (N/A)    Anesthesia type: General/MAC     Post pain: Adequate analgesia    Post assessment: no apparent anesthetic complications, tolerated procedure well and no evidence of recall    Last Vitals:   Vitals:    02/27/18 1432   BP:    Pulse:    Resp: 20   Temp:    SpO2: 100%       Post vital signs: stable    Level of consciousness: awake, alert  and oriented    Nausea/Vomiting: no nausea/no vomiting    Complications: none    Airway Patency: patent    Respiratory: unassisted    Cardiovascular: stable and blood pressure at baseline    Hydration: euvolemic

## 2018-02-27 NOTE — H&P
History & Physical - Short Stay  Gastroenterology      SUBJECTIVE:     Procedure: EGD    Chief Complaint/Indication for Procedure: gastric leak    History of Present Illness:  Patient is a 67 y.o. female with perf gastric ulcer s/p surgical repair with persistent leak coming for endoscopic closure.     PTA Medications   Medication Sig    allopurinol (ZYLOPRIM) 100 MG tablet TAKE 1 TABLET BY MOUTH EVERY DAY    amLODIPine (NORVASC) 5 MG tablet TAKE 1 TABLET BY MOUTH EVERY DAY.    aspirin (ECOTRIN) 81 MG EC tablet Take 1 tablet (81 mg total) by mouth once daily. (Patient taking differently: Take 81 mg by mouth once daily. NOT TAKING)    budesonide-formoterol 160-4.5 mcg (SYMBICORT) 160-4.5 mcg/actuation HFAA Inhale 2 puffs into the lungs every 12 (twelve) hours.    diclofenac (VOLTAREN) 50 MG EC tablet TAKE 1 TABLET BY MOUTH ONCE TO TWICE DAILY AS NEEDED FOR JOINT PAIN    dicyclomine (BENTYL) 20 mg tablet Take 20 mg by mouth every 6 (six) hours.    famotidine (PEPCID) 20 MG tablet Take 20 mg by mouth 2 (two) times daily.    losartan (COZAAR) 100 MG tablet TAKE 1 TABLET BY MOUTH EVERY DAY    metoprolol tartrate (LOPRESSOR) 25 MG tablet TAKE 1 TABLET(25 MG) BY MOUTH TWICE DAILY    mupirocin (BACTROBAN) 2 % ointment Apply topically 3 (three) times daily as needed.    ondansetron (ZOFRAN-ODT) 8 MG TbDL Take 8 mg by mouth every 6 (six) hours as needed.    oxyCODONE-acetaminophen (PERCOCET) 5-325 mg per tablet TK 1 OR 2 TS PO Q 4 TO 6 H PRN P    tramadol (ULTRAM) 50 mg tablet Take 1 tablet (50 mg total) by mouth every 6 (six) hours as needed.       Review of patient's allergies indicates:   Allergen Reactions    Adhesive      Other reaction(s): skin redness    Vancomycin      Other reaction(s): Red Man Syndrome        Past Medical History:   Diagnosis Date    Diabetes mellitus     no longer diabetic since gastric sleeve performed in 11/2/14    Gout     Hypertension     Obesity      Past Surgical History:    Procedure Laterality Date    ankle arthroscopy Right     bariatric gastric sleeve surgery      BELT ABDOMINOPLASTY       SECTION      EYE SURGERY Bilateral 2016    CATARACTS REMOVED     JOINT REPLACEMENT Left 10/24/2017    JOINT REPLACEMENT Right 10/02/2016    ROTATOR CUFF REPAIR      SLEEVE GASTROPLASTY      SOFT TISSUE MASS EXCISION      TONSILLECTOMY       Family History   Problem Relation Age of Onset    Hypertension Mother     Hypertension Father     Hyperlipidemia Brother     Heart attack Maternal Grandfather     Heart disease Maternal Grandfather     Stroke Paternal Grandfather      Social History   Substance Use Topics    Smoking status: Former Smoker     Packs/day: 1.00     Years: 20.00     Quit date: 1989    Smokeless tobacco: Never Used    Alcohol use Yes      Comment: social - rare       Review of Systems:  Constitutional: no fever or chills  Gastrointestinal: no nausea or vomiting, no abdominal pain or change in bowel habits    OBJECTIVE:     Vital Signs (Most Recent)  Temp: 97.5 °F (36.4 °C) (18 1214)  Pulse: 64 (18 1214)  Resp: 17 (18 1214)  BP: (!) 135/57 (18 1214)  SpO2: 98 % (18 1214)    Physical Exam:  General: well developed, well nourished  Abdomen: soft, non-tender non-distented; bowel sounds normal; no masses,  no organomegaly         ASSESSMENT/PLAN:     Patient is a 67 y.o. female with perf gastric ulcer s/p surgical repair with persistent leak coming for endoscopic closure.     Plan: EGD    Anesthesia Plan: Moderate Sedation    ASA Grade: ASA 2 - Patient with mild systemic disease with no functional limitations

## 2018-02-28 PROBLEM — S31.109A OPEN WOUND OF ABDOMINAL WALL, ANTERIOR, COMPLICATED: Status: ACTIVE | Noted: 2018-02-28

## 2018-03-07 ENCOUNTER — TELEPHONE (OUTPATIENT)
Dept: GASTROENTEROLOGY | Facility: CLINIC | Age: 68
End: 2018-03-07

## 2018-03-07 DIAGNOSIS — K25.5 GASTRIC PERFORATION: Primary | ICD-10-CM

## 2018-03-08 PROBLEM — R63.8 INCREASED NUTRITIONAL NEEDS: Status: ACTIVE | Noted: 2018-03-08

## 2018-03-09 PROBLEM — R63.8 INCREASED NUTRITIONAL NEEDS: Status: ACTIVE | Noted: 2018-03-09

## 2018-03-26 PROBLEM — R63.4 WEIGHT LOSS, UNINTENTIONAL: Status: ACTIVE | Noted: 2018-03-26

## 2018-03-27 PROBLEM — R10.0 SURGICAL ABDOMEN: Status: ACTIVE | Noted: 2018-03-27

## 2018-03-27 PROBLEM — R63.4 WEIGHT LOSS, UNINTENTIONAL: Status: ACTIVE | Noted: 2018-03-27

## 2018-03-27 PROBLEM — L30.4 INTERTRIGINOUS DERMATITIS ASSOCIATED WITH MOISTURE: Status: ACTIVE | Noted: 2018-03-27

## 2018-04-06 PROBLEM — T82.898A: Status: ACTIVE | Noted: 2018-04-06

## 2018-04-13 ENCOUNTER — TELEPHONE (OUTPATIENT)
Dept: GASTROENTEROLOGY | Facility: CLINIC | Age: 68
End: 2018-04-13

## 2018-04-13 PROBLEM — K31.2 GASTRIC HOURGLASS STRICTURE OR STENOSIS: Status: ACTIVE | Noted: 2018-04-13

## 2018-05-11 ENCOUNTER — TELEPHONE (OUTPATIENT)
Dept: GASTROENTEROLOGY | Facility: CLINIC | Age: 68
End: 2018-05-11

## 2018-05-15 ENCOUNTER — INITIAL CONSULT (OUTPATIENT)
Dept: BARIATRICS | Facility: CLINIC | Age: 68
End: 2018-05-15
Payer: MEDICARE

## 2018-05-15 VITALS
DIASTOLIC BLOOD PRESSURE: 52 MMHG | BODY MASS INDEX: 32 KG/M2 | WEIGHT: 216.06 LBS | HEART RATE: 78 BPM | HEIGHT: 69 IN | SYSTOLIC BLOOD PRESSURE: 103 MMHG

## 2018-05-15 DIAGNOSIS — K91.89 GASTRIC LEAK: Primary | ICD-10-CM

## 2018-05-15 PROCEDURE — 99203 OFFICE O/P NEW LOW 30 MIN: CPT | Mod: S$PBB,,, | Performed by: SURGERY

## 2018-05-15 PROCEDURE — 99213 OFFICE O/P EST LOW 20 MIN: CPT | Mod: PBBFAC | Performed by: SURGERY

## 2018-05-15 PROCEDURE — 99999 PR PBB SHADOW E&M-EST. PATIENT-LVL III: CPT | Mod: PBBFAC,,, | Performed by: SURGERY

## 2018-05-15 NOTE — LETTER
[facility] B[facility]  [address]  [facility] [address]  Phone: [phone]  Fax: [phone] May 15, 2018      Maryam Moreland MD  [address]  [facility] [address]    Patient: Marian Samuel   MR Number: [id number]   YOB: 1950   Date of Visit: 5/15/2018     Dear Dr. Jerry Moreland:    Thank you for referring Marian Samuel to me for evaluation. Attached you will find relevant portions of my assessment and plan of care.    ASSESSMENT/PLAN:     Gastrocutaneous fistula.  Status post sleeve gastrectomy.  Protein calorie malnutrition.  Chronic wound abdomen.    PLAN: For open repair with likely conversion to gastric bypass and J- tube.  She needs to be in better shape to tolerate this procedure nutritionally.    If you have questions, please do not hesitate to call me. I look forward to following Marian Samuel along with you.    Sincerely,      Gama Cha MD  Section Head - General, Laparoscopic, Bariatric  Acute Care and Oncologic Surgery   - Surgical Weight Loss Program  [facility]    WSR/muna    CC  Emigdio Gregg MD

## 2018-05-15 NOTE — PROGRESS NOTES
History & Physical    SUBJECTIVE:     History of Present Illness:  Patient is a 68 y.o. 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  She is s/p gastric sleeve 11/2014 complicated by leak.  She has been taking nsaids mostly in the mornings for arthritis and starting October (a burning pain).  In January she went to an urgent care and blood work was off.  She was told to go to the ER ([facility]).  At first she was thought to have diverticulitis.  She had a colon resection and repair of a gastric leak (possibly from ulcer and gastrocolic fistula).  Her postop course was complicated by reopening of the colon and gastric repairs.  She got repaired with open abdomen, ileostomy and drainage.  The gastric fistula reopened after this.  She was sent to GI here where endoscopic closure was performed but this reopened.  She has early satiety and if she takes liquids she notices it coming out of her wound. Currently she is nourished by tpn.  She is taking only water and ice by mouth.  Since January she has lost 43 pounds but only 3-4 pounds in the last month.  She says she her skin is pealing and she is losing hair.  She also says she has anemia and she was transfused 2 units prbc last week.      Chief Complaint   Patient presents with    Follow-up    Abdominal Pain    Emesis       Review of patient's allergies indicates:   Allergen Reactions    Adhesive      Other reaction(s): skin redness    Vancomycin      Other reaction(s): Red Man Syndrome       Current Outpatient Prescriptions   Medication Sig Dispense Refill    0.9 % SODIUM CHLORIDE (SODIUM CHLORIDE 0.9%) Soln 10 mLs by intravenous push route daily as needed (Before heprin flush and after IV meds).      acetaminophen (TYLENOL) 325 MG tablet Take 325 mg by mouth every 6 (six) hours as needed for Pain.      amLODIPine (NORVASC) 5 MG tablet Take 1 tablet (5 mg total) by mouth once daily. 30 tablet 0    aspirin (ECOTRIN) 81 MG EC tablet Take 1 tablet (81 mg total)  by mouth once daily.  0    budesonide-formoterol 160-4.5 mcg (SYMBICORT) 160-4.5 mcg/actuation HFAA Inhale 2 puffs into the lungs every 12 (twelve) hours. (Patient taking differently: Inhale 2 puffs into the lungs every 12 (twelve) hours as needed. ) 10.2 g 6    HEPARIN SOD,PORCINE/0.9 % NACL (HEPARIN FLUSH IV) Inject 10 Units/mL into the vein as needed (After saline flush).      metoprolol tartrate (LOPRESSOR) 25 MG tablet Take 1 tablet (25 mg total) by mouth 2 (two) times daily. 60 tablet 0    ondansetron (ZOFRAN-ODT) 4 MG TbDL Take 1 tablet (4 mg total) by mouth every 8 (eight) hours as needed. (Patient taking differently: Take 4 mg by mouth every 8 (eight) hours as needed (nausea). ) 45 tablet 0    oxyCODONE-acetaminophen (PERCOCET) 7.5-325 mg per tablet Take 1 tablet by mouth every 8 (eight) hours as needed for Pain. 45 tablet 0    pantoprazole (PROTONIX) 40 MG tablet Take 1 tablet (40 mg total) by mouth once daily. 30 tablet 11    sodium hypochlorite 0.125 % Soln by Misc.(Non-Drug; Combo Route) route as needed (midline abdominal wound cleansing). (Patient taking differently: 0.25 each by Misc.(Non-Drug; Combo Route) route as needed (midline abdominal wound cleansing).)  0    SSD 1 % cream APPLY EXTERNALLY TO THE AFFECTED AREA EVERY DAY 50 g 0    TPN ADULT 89 mL/hr by Intravenous (Continuous Infusion) route continuous.      lidocaine HCL 4% (XYLOCAINE) 4 % (40 mg/mL) external solution Apply topically as needed (wound care). (Patient taking differently: Apply 4 mLs topically as needed (wound care).) 50 mL 0     No current facility-administered medications for this visit.        Past Medical History:   Diagnosis Date    Diabetes mellitus     no longer diabetic since gastric sleeve performed in 14    Gout     Hypertension     Obesity      Past Surgical History:   Procedure Laterality Date    ankle arthroscopy Right     bariatric gastric sleeve surgery      BELT ABDOMINOPLASTY        "SECTION      exploratory laparotomy  02/01/2018    Dr. SYED Gregg, [facility]     EYE SURGERY Bilateral 09/2016    CATARACTS REMOVED     GASTRECTOMY      JEJUNOSTOMY  02/01/2018    Dr. SYED Gregg, [facility]     JOINT REPLACEMENT Left 10/24/2017    JOINT REPLACEMENT Right 10/02/2016    ROTATOR CUFF REPAIR      SLEEVE GASTROPLASTY      SOFT TISSUE MASS EXCISION      TONSILLECTOMY       Family History   Problem Relation Age of Onset    Hypertension Mother     Hypertension Father     Hyperlipidemia Brother     Heart attack Maternal Grandfather     Heart disease Maternal Grandfather     Stroke Paternal Grandfather      Social History   Substance Use Topics    Smoking status: Former Smoker     Packs/day: 1.00     Years: 20.00     Quit date: 7/20/1989    Smokeless tobacco: Never Used    Alcohol use No        Review of Systems:  Review of Systems   Constitutional: Negative for fever.   Respiratory: Negative for cough, chest tightness and shortness of breath.    Cardiovascular: Negative for chest pain.   Gastrointestinal: Positive for nausea. Negative for vomiting.   Genitourinary: Negative for difficulty urinating and dysuria.   Skin: Positive for wound. Negative for rash.   Neurological: Negative for seizures and headaches.   Hematological:        Has easy bruising and occasionally easy bleeding.  She is on heparin.       OBJECTIVE:     Vital Signs (Most Recent)  Pulse: 78 (05/15/18 1615)  BP: (!) 103/52 (05/15/18 1615)  5' 9" (1.753 m)  98 kg (216 lb 0.8 oz)     Physical Exam:  Physical Exam   Constitutional: She is oriented to person, place, and time. She appears well-developed and well-nourished.   Neck: Normal range of motion. Neck supple.   Cardiovascular: Normal rate, regular rhythm and normal heart sounds.    Pulmonary/Chest: Effort normal and breath sounds normal.   Abdominal: Soft. Bowel sounds are normal. There is no tenderness.       Neurological: She is alert and oriented to person, place, and time.   Skin: " Skin is warm and dry.   Psychiatric: She has a normal mood and affect. Her behavior is normal. Judgment and thought content normal.   Vitals reviewed.      Laboratory  CBC: Reviewed  CMP: Reviewed    Diagnostic Results:  CT: Reviewed  EGD: Reviewed    ASSESSMENT/PLAN:     Gastrocutaneous fistula.  S/p sleeve gastrectomy.  Protein calorie malnutrition.  Chronic wound abdomen.    PLAN:    For open repair with likely conversion to gastric bypass and j tube.  She needs to be in better shape to tolerate this procedure nutritionally (she will do this nearer to her).  Rtc one month.

## 2018-05-15 NOTE — Clinical Note
May 15, 2018      Maryam Moreland MD  [address]  [facility] [address]           [facility]  [address]  [facility] [address]  Phone: [phone]  Fax: [phone]          Patient: Marian Samuel   MR Number: [id number]   YOB: 1950   Date of Visit: 5/15/2018       Dear Dr. Maryam Moreland:    Thank you for referring Marian Samuel to me for evaluation. Attached you will find relevant portions of my assessment and plan of care.    If you have questions, please do not hesitate to call me. I look forward to following Marian Samuel along with you.    Sincerely,    Gama Cha MD    Enclosure  CC:  No Recipients    If you would like to receive this communication electronically, please contact [email or web address] or [phone] to request more information on [employer] [employer] access.    For providers and/or their staff who would like to refer a patient to [facility], please contact us through our one-stop-shop provider referral line, [facility], at [phone].    If you feel you have received this communication in error or would no longer like to receive these types of communications, please e-mail [email or web address]

## 2018-06-12 ENCOUNTER — OFFICE VISIT (OUTPATIENT)
Dept: BARIATRICS | Facility: CLINIC | Age: 68
End: 2018-06-12
Payer: MEDICARE

## 2018-06-12 VITALS
HEIGHT: 69 IN | SYSTOLIC BLOOD PRESSURE: 116 MMHG | WEIGHT: 223.13 LBS | BODY MASS INDEX: 33.05 KG/M2 | DIASTOLIC BLOOD PRESSURE: 56 MMHG | HEART RATE: 67 BPM

## 2018-06-12 DIAGNOSIS — E43 SEVERE PROTEIN-CALORIE MALNUTRITION: ICD-10-CM

## 2018-06-12 DIAGNOSIS — K91.89 GASTRIC LEAK: Primary | ICD-10-CM

## 2018-06-12 PROCEDURE — 99999 PR PBB SHADOW E&M-EST. PATIENT-LVL III: CPT | Mod: PBBFAC,,, | Performed by: SURGERY

## 2018-06-12 PROCEDURE — 99213 OFFICE O/P EST LOW 20 MIN: CPT | Mod: S$PBB,,, | Performed by: SURGERY

## 2018-06-12 PROCEDURE — 99213 OFFICE O/P EST LOW 20 MIN: CPT | Mod: PBBFAC | Performed by: SURGERY

## 2018-06-12 NOTE — PROGRESS NOTES
Subjective:       Patient ID: Marian Samuel is a 68 y.o. female.    Chief Complaint: Follow-up    HPI Gastrocutaneous fistula with about the same output as before but one has closed and another is producing more.  She has gained a few pounds.   Review of Systems   Constitutional: Negative for fever.   Respiratory: Negative for chest tightness and shortness of breath.    Cardiovascular: Negative for chest pain.   Gastrointestinal: Negative for abdominal pain, constipation and diarrhea.        Will get vomiting only with post nasal drip   Genitourinary: Negative for difficulty urinating and dysuria.   Hematological:        Has easy bruising but not much easy bleeding       Objective:    Labs reviewed, prealbumin down to 11  Physical Exam   Constitutional: She is oriented to person, place, and time. She appears well-developed.   Abdominal:       Neurological: She is alert and oriented to person, place, and time.   Skin: Skin is warm and dry.   Psychiatric: She has a normal mood and affect. Her behavior is normal. Judgment and thought content normal.   Vitals reviewed.      Assessment:       1. Gastric leak    2. Severe protein-calorie malnutrition        Plan:      She thinks she is eating better.  Follow up one month.  Consider j tube for preoperative feeding.

## 2018-07-17 ENCOUNTER — OFFICE VISIT (OUTPATIENT)
Dept: BARIATRICS | Facility: CLINIC | Age: 68
End: 2018-07-17
Payer: MEDICARE

## 2018-07-17 VITALS
SYSTOLIC BLOOD PRESSURE: 121 MMHG | HEART RATE: 73 BPM | HEIGHT: 69 IN | DIASTOLIC BLOOD PRESSURE: 58 MMHG | WEIGHT: 226.63 LBS | BODY MASS INDEX: 33.57 KG/M2

## 2018-07-17 DIAGNOSIS — E66.01 MORBID OBESITY: Primary | ICD-10-CM

## 2018-07-17 DIAGNOSIS — K25.5 GASTRIC PERFORATION: Primary | ICD-10-CM

## 2018-07-17 PROCEDURE — 99213 OFFICE O/P EST LOW 20 MIN: CPT | Mod: PBBFAC | Performed by: SURGERY

## 2018-07-17 PROCEDURE — 99213 OFFICE O/P EST LOW 20 MIN: CPT | Mod: S$PBB,,, | Performed by: SURGERY

## 2018-07-17 PROCEDURE — 99999 PR PBB SHADOW E&M-EST. PATIENT-LVL III: CPT | Mod: PBBFAC,,, | Performed by: SURGERY

## 2018-07-17 NOTE — LETTER
[facility]  [address]  [facility] [address]  Phone: [phone]  Fax: [phone] July 17, 2018      Emigdio Gregg MD  [address] [place] [id number]    Patient: Marian Samuel   MR Number: [id number]   YOB: 1950   Date of Visit: 7/17/2018     Dear Dr. Gregg:    Thank you for referring Marian Samuel to me for evaluation. Attached you will find relevant portions of my assessment and plan of care.    Assessment:       1. Gastric perforation      Improving fistula output with improving nutrition.  Low albumin may be due to sampling central line.  Plan:      Will continue to observe and hold of on gastrectomy and fernando construction.  Obtain blood labs from arm.  RTC in one month.    If you have questions, please do not hesitate to call me. I look forward to following Marian Samuel along with you.    Sincerely,      Gama Cha MD  Section Head - General, Laparoscopic, Bariatric  Acute Care and Oncologic Surgery   - Surgical Weight Loss Program  [facility]    WSR/kandicek    CC  Maryam Moreland MD

## 2018-07-17 NOTE — PROGRESS NOTES
Subjective:       Patient ID: Marian Samuel is a 68 y.o. female.    Chief Complaint: Follow-up    HPI S/p colon and gastric resections and now with gastric fistula.  She is feeling improved since I last saw her and has gained 4 pounds.  One fistula has nearly closed and her main fistula is putting out less.  Review of Systems   Constitutional: Positive for fever. Negative for unexpected weight change.        Was sick last week with nausea and low grade fever, better now   Respiratory: Negative for cough, chest tightness and shortness of breath.    Cardiovascular: Negative for chest pain.   Gastrointestinal: Positive for nausea and vomiting. Negative for constipation and diarrhea.        Ostomy   Hematological:        Has easy bruising but no easy bleeding       Objective:      Physical Exam   Constitutional: She is oriented to person, place, and time. She appears well-developed and well-nourished.   Cardiovascular: Normal rate, regular rhythm and normal heart sounds.    Pulmonary/Chest: Effort normal and breath sounds normal.   Abdominal:       Neurological: She is alert and oriented to person, place, and time.   Skin: Skin is warm and dry.   Psychiatric: She has a normal mood and affect. Her behavior is normal. Judgment and thought content normal.   Vitals reviewed.      Labs reviewed    Assessment:       1. Gastric perforation      Improving fistula output with improving nutrition.  Low albumin may be due to sampling central line.  Plan:      Will continue to observe and hold of on gastrectomy and fernando construction.  Obtain blood labs from arm.  Rtc one month.  Her other surgeon will take care of her ileostomy.

## 2018-08-03 PROBLEM — L89.312 PRESSURE ULCER OF RIGHT BUTTOCK, STAGE 2: Status: ACTIVE | Noted: 2018-08-03

## 2018-09-07 PROBLEM — L89.322 PRESSURE ULCER OF LEFT BUTTOCK, STAGE 2: Status: ACTIVE | Noted: 2018-09-07

## 2018-09-14 PROBLEM — K63.2 ENTEROCUTANEOUS FISTULA: Status: ACTIVE | Noted: 2018-09-14

## 2018-09-14 PROBLEM — Z93.2 ILEOSTOMY IN PLACE: Status: ACTIVE | Noted: 2018-09-14

## 2018-09-16 PROBLEM — K31.6 GASTROCUTANEOUS FISTULA: Status: ACTIVE | Noted: 2018-09-16

## 2018-09-23 NOTE — TELEPHONE ENCOUNTER
See email   Spoke with Sania from [facility] at [facility], case # [id number]. States pt is candidate for eye and tissue donation.

## 2018-10-04 ENCOUNTER — OFFICE VISIT (OUTPATIENT)
Dept: INTERNAL MEDICINE | Facility: CLINIC | Age: 68
End: 2018-10-04
Payer: MEDICARE

## 2018-10-04 VITALS
HEART RATE: 83 BPM | TEMPERATURE: 100 F | SYSTOLIC BLOOD PRESSURE: 142 MMHG | BODY MASS INDEX: 31.49 KG/M2 | HEIGHT: 67 IN | WEIGHT: 200.63 LBS | RESPIRATION RATE: 18 BRPM | DIASTOLIC BLOOD PRESSURE: 58 MMHG

## 2018-10-04 DIAGNOSIS — R53.81 DEBILITY: ICD-10-CM

## 2018-10-04 DIAGNOSIS — I10 HTN (HYPERTENSION), BENIGN: Primary | ICD-10-CM

## 2018-10-04 DIAGNOSIS — J45.30 MILD PERSISTENT ASTHMA, UNSPECIFIED WHETHER COMPLICATED: ICD-10-CM

## 2018-10-04 PROCEDURE — 99999 PR PBB SHADOW E&M-EST. PATIENT-LVL III: CPT | Mod: PBBFAC,,, | Performed by: INTERNAL MEDICINE

## 2018-10-04 PROCEDURE — 99214 OFFICE O/P EST MOD 30 MIN: CPT | Mod: S$PBB,,, | Performed by: INTERNAL MEDICINE

## 2018-10-04 PROCEDURE — 99213 OFFICE O/P EST LOW 20 MIN: CPT | Mod: PBBFAC,PO | Performed by: INTERNAL MEDICINE

## 2018-10-24 PROBLEM — N17.9 AKI (ACUTE KIDNEY INJURY): Status: ACTIVE | Noted: 2018-10-24

## 2018-10-24 PROBLEM — N17.9 ACUTE RENAL FAILURE SUPERIMPOSED ON CHRONIC KIDNEY DISEASE: Status: ACTIVE | Noted: 2018-10-24

## 2018-10-24 PROBLEM — E87.8 ELECTROLYTE ABNORMALITY: Status: ACTIVE | Noted: 2018-10-24

## 2018-10-24 PROBLEM — E87.20 METABOLIC ACIDOSIS: Status: ACTIVE | Noted: 2018-10-24

## 2018-10-24 PROBLEM — N18.9 ACUTE RENAL FAILURE SUPERIMPOSED ON CHRONIC KIDNEY DISEASE: Status: ACTIVE | Noted: 2018-10-24

## 2018-10-24 PROBLEM — J98.2 PNEUMOMEDIASTINUM: Status: ACTIVE | Noted: 2018-10-24

## 2018-10-24 PROBLEM — R11.2 NAUSEA & VOMITING: Status: ACTIVE | Noted: 2018-10-24

## 2018-10-25 ENCOUNTER — DOCUMENTATION ONLY (OUTPATIENT)
Dept: BARIATRICS | Facility: CLINIC | Age: 68
End: 2018-10-25

## 2018-10-26 DIAGNOSIS — Z12.39 BREAST CANCER SCREENING: ICD-10-CM

## 2018-11-06 NOTE — PROGRESS NOTES
History of present illness:  68-year-old lady with several chronic medical conditions including hypertension diet-controlled diabetes mellitus obesity asthma and others.  Recently with prolonged issues involving colonic and gastric perforations requiring ileostomy.  She has had recurrent gastric cutaneous fistulas.  Being followed by General surgery Dr. Gregg.  She is tolerating p.o. intake at this time.  Also uses 1 ensure 1 other nutritional supplement daily.  Denies fever chills.  No nausea vomiting.  She is seeing wound care as well.    Current medications:  Medications noted reviewed the electronic medical record medication list    Review of systems:  Constitutional:  No fever no chills  Cardiovascular:  Denies chest pain palpitations syncope or presyncope.  Respiratory:  No cough shortness of breath no hemoptysis.    Past medical history, past surgical history family medical history social history is are all noted reviewed in the electronic medical record history sections    Physical examination:  General:  Alert pleasant cooperative lady no acute distress.  Vital signs:  Blood pressure noted to be 90/60.  Afebrile.  Eyes:  Sclerae white not icteric.  HEENT:  Normocephalic neck supple no masses no thyromegaly.  Mouth and pharynx normal no thrush.  Lungs:  Clear to auscultation.  Cardiovascular:  Regular rate and rhythm. No significant murmur.  Carotids full bilaterally bruits.  There is no significant peripheral extremity edema.  Mental status:  Alert oriented affect and mood all appropriate    Impression:  Recent prolonged illness as outlined above related to gastro cutaneous fistula with debility  Hypertension possibly excessively control though seems to fluctuate.  Diet-controlled diabetes mellitus  Mild persistent asthma is controlled with current pharmacologic regimen.    Plan:  Advise continue observe blood pressure is if remain lower than desired discuss briefly tapering off the metoprolol.  Continue  follow-up with her other providers regarding her ongoing issues with the gastric cutaneous fistula etc.  Return to clinic 3 months or before p.r.n.

## 2018-11-17 PROBLEM — R79.89 LOW VITAMIN D LEVEL: Status: ACTIVE | Noted: 2018-11-17

## 2018-12-03 PROBLEM — J98.2 PNEUMOMEDIASTINUM: Status: RESOLVED | Noted: 2018-10-24 | Resolved: 2018-12-03

## 2018-12-03 PROBLEM — K57.20 DIVERTICULITIS OF LARGE INTESTINE WITH PERFORATION AND ABSCESS WITHOUT BLEEDING: Status: RESOLVED | Noted: 2018-01-19 | Resolved: 2018-12-03

## 2019-02-02 PROBLEM — I10 ESSENTIAL (PRIMARY) HYPERTENSION: Status: ACTIVE | Noted: 2019-02-02

## 2019-02-02 PROBLEM — R50.9 ACUTE FEBRILE ILLNESS: Status: ACTIVE | Noted: 2019-02-02

## 2019-02-02 PROBLEM — D61.818 PANCYTOPENIA: Status: ACTIVE | Noted: 2019-02-02

## 2019-02-02 PROBLEM — E11.9 DIABETES MELLITUS: Status: ACTIVE | Noted: 2019-02-02

## 2019-02-02 PROBLEM — R07.9 CHEST PAIN: Status: ACTIVE | Noted: 2019-02-02

## 2019-02-04 PROBLEM — R78.81 POSITIVE BLOOD CULTURE: Status: ACTIVE | Noted: 2019-02-04

## 2019-02-13 PROBLEM — R55 SYNCOPE: Status: ACTIVE | Noted: 2019-02-13

## 2019-03-13 PROBLEM — R11.10 VOMITING: Status: ACTIVE | Noted: 2019-03-13

## 2019-03-14 ENCOUNTER — TELEPHONE (OUTPATIENT)
Dept: GASTROENTEROLOGY | Facility: CLINIC | Age: 69
End: 2019-03-14

## 2019-03-14 NOTE — TELEPHONE ENCOUNTER
----- Message from Jose Alberto Madrigal sent at 3/14/2019 12:54 PM CDT -----  Contact: Carina with [facility]  Type: Needs Medical Advice    Who Called:  Carina with [facility]  Best Call Back Number: [phone]  Additional Information: Patient had procedure on 3/13/19 to move displaced balloon and today the balloon has moved again. Please advise

## 2019-03-14 NOTE — TELEPHONE ENCOUNTER
Spoke to Carina from [facility]. Instr pt needs to go to ER per Dr Motta as we have no supplies here in the office to deal with malfunctioning peg. States she will call family & let them know

## 2019-04-18 PROBLEM — T82.598A: Status: ACTIVE | Noted: 2019-04-18

## 2019-07-09 PROBLEM — E87.5 HYPERKALEMIA: Status: ACTIVE | Noted: 2019-07-09

## 2019-07-09 PROBLEM — N28.9 ACUTE RENAL INSUFFICIENCY: Status: ACTIVE | Noted: 2019-07-09

## 2019-07-11 PROBLEM — D69.6 THROMBOCYTOPENIA: Status: ACTIVE | Noted: 2019-07-11

## 2019-10-08 PROBLEM — N18.9 ACUTE ON CHRONIC RENAL FAILURE: Status: ACTIVE | Noted: 2019-10-08

## 2019-10-08 PROBLEM — N17.9 ACUTE ON CHRONIC RENAL FAILURE: Status: ACTIVE | Noted: 2019-10-08

## 2019-10-11 PROBLEM — N30.00 ACUTE CYSTITIS WITHOUT HEMATURIA: Status: ACTIVE | Noted: 2019-10-11

## 2019-10-16 PROBLEM — E53.8 B12 DEFICIENCY: Status: ACTIVE | Noted: 2019-10-16

## 2019-10-16 PROBLEM — E52: Status: ACTIVE | Noted: 2019-10-16

## 2019-10-16 PROBLEM — E55.9 VITAMIN D DEFICIENCY: Status: ACTIVE | Noted: 2019-10-16

## 2019-10-18 PROBLEM — E83.42 HYPOMAGNESEMIA: Status: ACTIVE | Noted: 2019-10-18

## 2019-10-18 PROBLEM — K55.9: Status: ACTIVE | Noted: 2019-10-18

## 2019-10-18 PROBLEM — K13.70 MOUTH LESION: Status: ACTIVE | Noted: 2019-10-18

## 2019-11-27 PROBLEM — E63.9 NUTRITIONAL DEFICIENCY DISORDER: Status: ACTIVE | Noted: 2019-11-27
